# Patient Record
Sex: MALE | Race: NATIVE HAWAIIAN OR OTHER PACIFIC ISLANDER | NOT HISPANIC OR LATINO | Employment: FULL TIME | URBAN - METROPOLITAN AREA
[De-identification: names, ages, dates, MRNs, and addresses within clinical notes are randomized per-mention and may not be internally consistent; named-entity substitution may affect disease eponyms.]

---

## 2018-08-16 ENCOUNTER — APPOINTMENT (OUTPATIENT)
Dept: RADIOLOGY | Facility: MEDICAL CENTER | Age: 23
End: 2018-08-16
Attending: EMERGENCY MEDICINE
Payer: OTHER GOVERNMENT

## 2018-08-16 ENCOUNTER — HOSPITAL ENCOUNTER (EMERGENCY)
Facility: MEDICAL CENTER | Age: 23
End: 2018-08-16
Attending: EMERGENCY MEDICINE
Payer: OTHER GOVERNMENT

## 2018-08-16 VITALS
DIASTOLIC BLOOD PRESSURE: 76 MMHG | WEIGHT: 160 LBS | SYSTOLIC BLOOD PRESSURE: 126 MMHG | TEMPERATURE: 98.2 F | HEART RATE: 67 BPM | RESPIRATION RATE: 18 BRPM | OXYGEN SATURATION: 97 % | BODY MASS INDEX: 23.7 KG/M2 | HEIGHT: 69 IN

## 2018-08-16 DIAGNOSIS — R51.9 ACUTE NONINTRACTABLE HEADACHE, UNSPECIFIED HEADACHE TYPE: Primary | ICD-10-CM

## 2018-08-16 PROCEDURE — 700117 HCHG RX CONTRAST REV CODE 255: Performed by: EMERGENCY MEDICINE

## 2018-08-16 PROCEDURE — A9585 GADOBUTROL INJECTION: HCPCS | Performed by: EMERGENCY MEDICINE

## 2018-08-16 PROCEDURE — 70544 MR ANGIOGRAPHY HEAD W/O DYE: CPT

## 2018-08-16 PROCEDURE — 70553 MRI BRAIN STEM W/O & W/DYE: CPT

## 2018-08-16 PROCEDURE — 99284 EMERGENCY DEPT VISIT MOD MDM: CPT

## 2018-08-16 RX ORDER — GADOBUTROL 604.72 MG/ML
7.5 INJECTION INTRAVENOUS ONCE
Status: COMPLETED | OUTPATIENT
Start: 2018-08-16 | End: 2018-08-16

## 2018-08-16 RX ADMIN — GADOBUTROL 7.5 ML: 604.72 INJECTION INTRAVENOUS at 08:08

## 2018-08-16 ASSESSMENT — PAIN SCALES - GENERAL: PAINLEVEL_OUTOF10: 1

## 2018-08-16 NOTE — DISCHARGE INSTRUCTIONS
Follow-up with primary care or base physician tomorrow for reevaluation.    Encourage oral fluid hydration.  Resume activity as tolerated.    Return to the emergency department for recurrent or intractable headache, altered mental status, visual changes, slurred speech, focal weakness, seizure, fever, vomiting or other new concerns.    General Headache Without Cause  Introduction  A headache is pain or discomfort felt around the head or neck area. There are many causes and types of headaches. In some cases, the cause may not be found.  Follow these instructions at home:  Managing pain  · Take over-the-counter and prescription medicines only as told by your doctor.  · Lie down in a dark, quiet room when you have a headache.  · If directed, apply ice to the head and neck area:  ¨ Put ice in a plastic bag.  ¨ Place a towel between your skin and the bag.  ¨ Leave the ice on for 20 minutes, 2-3 times per day.  · Use a heating pad or hot shower to apply heat to the head and neck area as told by your doctor.  · Keep lights dim if bright lights bother you or make your headaches worse.  Eating and drinking  · Eat meals on a regular schedule.  · Lessen how much alcohol you drink.  · Lessen how much caffeine you drink, or stop drinking caffeine.  General instructions  · Keep all follow-up visits as told by your doctor. This is important.  · Keep a journal to find out if certain things bring on headaches. For example, write down:  ¨ What you eat and drink.  ¨ How much sleep you get.  ¨ Any change to your diet or medicines.  · Relax by getting a massage or doing other relaxing activities.  · Lessen stress.  · Sit up straight. Do not tighten (tense) your muscles.  · Do not use tobacco products. This includes cigarettes, chewing tobacco, or e-cigarettes. If you need help quitting, ask your doctor.  · Exercise regularly as told by your doctor.  · Get enough sleep. This often means 7-9 hours of sleep.  Contact a doctor if:  · Your  symptoms are not helped by medicine.  · You have a headache that feels different than the other headaches.  · You feel sick to your stomach (nauseous) or you throw up (vomit).  · You have a fever.  Get help right away if:  · Your headache becomes really bad.  · You keep throwing up.  · You have a stiff neck.  · You have trouble seeing.  · You have trouble speaking.  · You have pain in the eye or ear.  · Your muscles are weak or you lose muscle control.  · You lose your balance or have trouble walking.  · You feel like you will pass out (faint) or you pass out.  · You have confusion.  This information is not intended to replace advice given to you by your health care provider. Make sure you discuss any questions you have with your health care provider.  Document Released: 09/26/2009 Document Revised: 05/25/2017 Document Reviewed: 04/11/2016  © 2017 [employer]

## 2018-08-16 NOTE — ED NOTES
Assumed care of pt from CLAUDIA Espino. Pt resting in stretcher. Resp even and unlabored. NAD. Awaiting MRI - aware of plan of care. Offers no needs. Fall precautions in place. Call bell in reach. Ongoing monitoring.

## 2018-08-16 NOTE — ED PROVIDER NOTES
"ED Provider Note    CHIEF COMPLAINT  Chief Complaint   Patient presents with   • Headache     Transfer from [facility] for + LOC, headache starting yesterday. LP done, toradol and zofran given PTA. Possible neuro consult and CTA       HPI  Deandre Smith is a 22 y.o. male who presents to the emergency department by ambulance from outside facility for evaluation of headache and possible subarachnoid hemorrhage.  Patient with onset of headache more than 36 hours ago, 2-4 out of 10 \"discomfort\" that became suddenly worse last night, 8 out of 10, frontal throbbing and 10 out of 10 pain behind his right eye before  he \"passed out due to the pain.\"  Patient was transported to outside facility.  Neuro intact.  CT head was reportedly initially reported as normal, but impression with pituitary region abnormality.  Lumbar puncture with CSF tube 3 elevated RBCs.  Pain however improved after Toradol and Zofran prior to arrival.    Patient describes history for recurrent headaches, although normally improved with ibuprofen which he took 2 days ago without relief this time.    REVIEW OF SYSTEMS  See HPI for further details.     PAST MEDICAL HISTORY   Denies    SOCIAL HISTORY  Social History     Social History Main Topics   • Smoking status: Former Smoker   • Smokeless tobacco: Not on file   • Alcohol use Yes      Comment: occ   • Drug use: No   • Sexual activity: Not on file       SURGICAL HISTORY  patient denies any surgical history    CURRENT MEDICATIONS  Home Medications    **Home medications have not yet been reviewed for this encounter**         ALLERGIES  No Known Allergies    PHYSICAL EXAM  VITAL SIGNS: /76   Pulse 65   Temp 36.8 °C (98.2 °F)   Resp 16   Ht 1.753 m (5' 9\")   Wt 72.6 kg (160 lb)   SpO2 97%   BMI 23.63 kg/m²   Pulse ox interpretation: I interpret this pulse ox as normal.  Constitutional: Alert in no apparent distress.  HENT: Normocephalic, atraumatic. Bilateral external ears normal, " Nose normal. Moist mucous membranes.    Eyes: Pupils are equal and reactive, 4-3 bilaterally.  Conjunctiva normal.  EOMI, no nystagmus.  Neck: Normal range of motion, Supple.  No meningeal irritation.  Lymphatic: No lymphadenopathy noted.   Cardiovascular: Normal peripheral perfusion.  Thorax & Lungs: Nonlabored respirations.  Skin: Warm, Dry  Musculoskeletal: Good range of motion in all major joints.   Neurologic: Alert finger to nose intact bilaterally.  No pronator drift.  And cohesive.  Cranial nerves II through XII intact bilaterally.  5 out of 5 strength in 4 extremities.  Straight leg raise intact bilaterally.  Psychiatric: Affect normal, Judgment normal, Mood normal.       DIAGNOSTIC STUDIES / PROCEDURES    RADIOLOGY  MR-MRA HEAD-W/O   Final Result      There is no aneurysm, stenosis or vascular malformation.      MR-BRAIN-WITH & W/O   Final Result      1.  There is an approximately 6 mm sized well-defined T1 hyperintense and T2 hypointense lesion in the posterior pituitary gland. 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 The other less likely differential    diagnosis includes pituitary microadenoma with internal hemorrhage. There are no previous images available in the PACS for the comparison.   2.  A few nonspecific white matter T2 hyperintensities likely representing nonspecific foci of gliosis.              COURSE & MEDICAL DECISION MAKING  Nursing notes and vital signs were reviewed. (See chart for details)  The patients records were reviewed, history was obtained from the patient;     [facility] physician contacted after my initial evaluation for tubal and cell count.  This will be added and he will call back with results.    Tube 1 cell count with , WBCs  6.8.    9:40 AM MRI and MRA resulted as above.       9:55 AM Dr. Rosas, neurosurgery, is aware of the patient presentation and MRI findings.  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  He does not feel as though there is a hemorrhagic component to this finding, nor would be causing patient symptomatology.  No indication for further evaluation at this time.    ED evaluation for headache is unrevealing.  Initial concern for subarachnoid hemorrhage due to elevated RBCs in CSF is much less likely given the clearance of RBCs to 100 on tube 4.  MRA which was ordered before these results were available is unrevealing for aneurysm.  MRI with contrast for abnormal head CT more consistent with incidental Rathke's cleft cyst and unlikely the cause of his headache.  Imaging has been reviewed with neurosurgery.  Patient remains quite asymptomatic in the emergency department, requires no additional medication after Toradol prior to arrival for his discomfort.  Headache 1 out of 10.  Patient is nonfocal.  He ambulates independently without ataxia.  Tolerating oral fluids.  Vital signs are stable without fever tachycardia.  Blood pressures well controlled.    Stable for discharge at this time, anticipatory guidance provided, close follow-up on face is encouraged and strict ED return instructions have been detailed.  Patient is aware the findings and agreeable to the disposition and plan.      FINAL IMPRESSION  (R51) Acute nonintractable headache, unspecified headache type  (primary encounter diagnosis)      Electronically signed by: Miracle Cates, 8/16/2018 9:47 AM      This dictation was created using voice recognition software. The accuracy of the dictation is limited to the abilities of the software. I expect there may be some errors of grammar and possibly content. The nursing notes were reviewed and certain aspects of this information were incorporated into this note.

## 2018-08-16 NOTE — ED TRIAGE NOTES
Chief Complaint   Patient presents with   • Headache     Transfer from [facility] for + LOC, headache starting yesterday. LP done, toradol and zofran given PTA. Possible neuro consult and CTA

## 2018-08-16 NOTE — ED NOTES
VSS. Pt given DC instructions with verbalized understanding. Ambulatory to exit with steady gait.